# Patient Record
Sex: FEMALE
[De-identification: names, ages, dates, MRNs, and addresses within clinical notes are randomized per-mention and may not be internally consistent; named-entity substitution may affect disease eponyms.]

---

## 2023-11-01 ENCOUNTER — DASHBOARD ENCOUNTERS (OUTPATIENT)
Age: 33
End: 2023-11-01

## 2023-11-01 ENCOUNTER — OFFICE VISIT (OUTPATIENT)
Dept: URBAN - METROPOLITAN AREA CLINIC 19 | Facility: CLINIC | Age: 33
End: 2023-11-01
Payer: COMMERCIAL

## 2023-11-01 VITALS
DIASTOLIC BLOOD PRESSURE: 72 MMHG | BODY MASS INDEX: 21 KG/M2 | TEMPERATURE: 98.6 F | WEIGHT: 123 LBS | SYSTOLIC BLOOD PRESSURE: 110 MMHG | HEIGHT: 64 IN | HEART RATE: 88 BPM

## 2023-11-01 DIAGNOSIS — K92.1 HEMATOCHEZIA: ICD-10-CM

## 2023-11-01 DIAGNOSIS — K64.5 THROMBOSED EXTERNAL HEMORRHOID: ICD-10-CM

## 2023-11-01 PROCEDURE — 99243 OFF/OP CNSLTJ NEW/EST LOW 30: CPT | Performed by: INTERNAL MEDICINE

## 2023-11-01 PROCEDURE — 99203 OFFICE O/P NEW LOW 30 MIN: CPT | Performed by: INTERNAL MEDICINE

## 2023-11-01 PROCEDURE — 46600 DIAGNOSTIC ANOSCOPY SPX: CPT | Performed by: INTERNAL MEDICINE

## 2023-11-01 NOTE — PHYSICAL EXAM GASTROINTESTINAL
Abdomen , soft, nontender, nondistended , no guarding or rigidity , no masses palpable , normal bowel sounds , Liver and Spleen , no hepatomegaly present , no hepatosplenomegaly , liver nontender , spleen not palpable , Rectal , normal sphincter tone , noted thrombosed external hemorrhoids.  alec with no anal fissure,  rectal masses or bleeding present.  anoscopy with small internal hemorrhoids.  Gina Olsen MA present as chaperone

## 2023-11-01 NOTE — HPI-TODAY'S VISIT:
pt is pleasant 33 yo female referred by Dr. Hernandez of [facility] in [place] here for issues with hemorrhoids a copy of this note will be sent to referring physician. pt notes onset of perianal bulge that started 6 days ago on friday denies any constipation or straining prior notes assoc perianal pain with some bleeding she saw her pcp, Dr. Hernandez for it on monday with rectal exam done.  no labs she gave her topical cream but pt did not fill it/use it she notes symptoms have improved abit but still ongoing.   she notes no prir hx of bowel changes, abd pain, gi bleed no fam hx of colon cancer, colon polyps, or ibd.    no other complaints.